# Patient Record
Sex: MALE | Race: WHITE | URBAN - METROPOLITAN AREA
[De-identification: names, ages, dates, MRNs, and addresses within clinical notes are randomized per-mention and may not be internally consistent; named-entity substitution may affect disease eponyms.]

---

## 2022-05-16 ENCOUNTER — OFFICE VISIT (OUTPATIENT)
Dept: FAMILY MEDICINE CLINIC | Facility: CLINIC | Age: 37
End: 2022-05-16
Payer: COMMERCIAL

## 2022-05-16 VITALS
OXYGEN SATURATION: 99 % | TEMPERATURE: 98 F | DIASTOLIC BLOOD PRESSURE: 80 MMHG | WEIGHT: 180 LBS | BODY MASS INDEX: 28.93 KG/M2 | SYSTOLIC BLOOD PRESSURE: 134 MMHG | HEIGHT: 66 IN | RESPIRATION RATE: 16 BRPM | HEART RATE: 100 BPM

## 2022-05-16 DIAGNOSIS — J06.9 VIRAL URI: Primary | ICD-10-CM

## 2022-05-16 PROCEDURE — 3008F BODY MASS INDEX DOCD: CPT | Performed by: PHYSICIAN ASSISTANT

## 2022-05-16 PROCEDURE — 3079F DIAST BP 80-89 MM HG: CPT | Performed by: PHYSICIAN ASSISTANT

## 2022-05-16 PROCEDURE — 3075F SYST BP GE 130 - 139MM HG: CPT | Performed by: PHYSICIAN ASSISTANT

## 2022-05-16 PROCEDURE — 99202 OFFICE O/P NEW SF 15 MIN: CPT | Performed by: PHYSICIAN ASSISTANT

## 2022-05-18 LAB — SARS-COV-2 RNA RESP QL NAA+PROBE: NOT DETECTED

## 2025-02-01 NOTE — ED PROVIDER NOTES
Patient Seen in: [facility]      History     Chief Complaint   Patient presents with    Cough/URI     Stated Complaint: Flu Symptoms    Subjective:   HPI      39-year-old male presents to the immediate care for evaluation of a 24-hour history of fever, malaise, body aches and a sore throat with minimal nasal congestion and cough.  No vomiting or diarrhea.  No shortness of breath.    Objective:     History reviewed. No pertinent past medical history.           History reviewed. No pertinent surgical history.             Social History     Socioeconomic History    Marital status: Single   Tobacco Use    Smoking status: Never    Smokeless tobacco: Never              Review of Systems    Positive for stated complaint: Flu Symptoms  Other systems are as noted in HPI.  Constitutional and vital signs reviewed.      All other systems reviewed and negative except as noted above.    Physical Exam     ED Triage Vitals [02/01/25 1329]   /88   Pulse (!) 140   Resp 18   Temp 99.5 °F (37.5 °C)   Temp src Oral   SpO2 96 %   O2 Device None (Room air)       Current Vitals:   Vital Signs  BP: 142/88  Pulse: (!) 140  Resp: 18  Temp: 99.5 °F (37.5 °C)  Temp src: Oral    Oxygen Therapy  SpO2: 96 %  O2 Device: None (Room air)        Physical Exam     General Appearance: This is a young adult male sitting on a gurney.  Vital signs were reviewed per nurses notes.  Pulse oximetry is 96% on room air.  Temperature is 99.5.  HEENT: Normocephalic/atraumatic.  Anicteric sclera.  Oral mucosa is moist.  Oropharynx is normal.  Neck: No adenopathy or thyromegaly.  No hoarseness or stridor.  Lungs are clear to auscultation.  Heart exam: Normal S1-S2 without extra sounds or murmurs.  Regular rate and rhythm.  Abdomen is nontender.  Extremities are atraumatic.  Skin is dry without rashes or lesions.  Neuroexam: Awake, conversive and moving all 4 extremities well.  ED Course     Labs Reviewed   POCT FLU TEST - Abnormal; Notable  for the following components:       Result Value    POCT INFLUENZA A Positive (*)     All other components within normal limits    Narrative:     This assay is a rapid molecular in vitro test utilizing nucleic acid amplification of influenza A and B viral RNA.   RAPID SARS-COV-2 BY PCR - Normal            Test results and treatment plan were discussed.  Given the patient's young age without comorbidities, antivirals were not recommended at this time.       MDM      #1.  Influenza A.  COVID-negative.        Medical Decision Making      Disposition and Plan     Clinical Impression:  1. Influenza A         Disposition:  Discharge  2/1/2025  2:00 pm    Follow-up:  Sabiha Ritter  [address] [address]  [phone]    Call   As needed          Medications Prescribed:  There are no discharge medications for this patient.          Supplementary Documentation:

## 2025-02-01 NOTE — ED INITIAL ASSESSMENT (HPI)
Body aches, fevers, sore throat, headaches, cough starting Thursday.     Tylenol last 2 hours ago

## 2025-02-01 NOTE — DISCHARGE INSTRUCTIONS
Tylenol or Advil for fever.    You may use DayQuil and NyQuil for your nasal congestion.    Throat lozenges and honey for sore throat.    You should remain  from any household members and work from home until you are symptom-free for 24 hours.

## 2025-02-03 NOTE — TELEPHONE ENCOUNTER
Patient paged over the weekend saying he was an established patient of Dr. Brewster (EPIC shows no previous office visit). He was diagnosed with influenza A at urgent care. He continued to have fever and asks about fever reducing medication. Recommended tylenol 500mg every 6 hours as needed or ibuprofen 600mg every 8 hours and take with food. Ok to alternate.